# Patient Record
Sex: MALE | Race: WHITE | HISPANIC OR LATINO | URBAN - METROPOLITAN AREA
[De-identification: names, ages, dates, MRNs, and addresses within clinical notes are randomized per-mention and may not be internally consistent; named-entity substitution may affect disease eponyms.]

---

## 2018-02-04 ENCOUNTER — EMERGENCY (EMERGENCY)
Facility: HOSPITAL | Age: 23
LOS: 1 days | Discharge: HOME | End: 2018-02-04
Attending: EMERGENCY MEDICINE | Admitting: EMERGENCY MEDICINE
Payer: COMMERCIAL

## 2018-02-04 VITALS
RESPIRATION RATE: 20 BRPM | OXYGEN SATURATION: 98 % | SYSTOLIC BLOOD PRESSURE: 135 MMHG | HEART RATE: 75 BPM | TEMPERATURE: 98 F | DIASTOLIC BLOOD PRESSURE: 75 MMHG

## 2018-02-04 VITALS
DIASTOLIC BLOOD PRESSURE: 77 MMHG | HEART RATE: 80 BPM | SYSTOLIC BLOOD PRESSURE: 138 MMHG | WEIGHT: 240.08 LBS | HEIGHT: 73 IN | TEMPERATURE: 99 F | RESPIRATION RATE: 20 BRPM | OXYGEN SATURATION: 98 %

## 2018-02-04 DIAGNOSIS — R07.1 CHEST PAIN ON BREATHING: ICD-10-CM

## 2018-02-04 DIAGNOSIS — R07.89 OTHER CHEST PAIN: ICD-10-CM

## 2018-02-04 PROCEDURE — 93010 ELECTROCARDIOGRAM REPORT: CPT | Mod: NC

## 2018-02-04 PROCEDURE — 71046 X-RAY EXAM CHEST 2 VIEWS: CPT | Mod: 26

## 2018-02-04 PROCEDURE — 96372 THER/PROPH/DIAG INJ SC/IM: CPT

## 2018-02-04 PROCEDURE — 99284 EMERGENCY DEPT VISIT MOD MDM: CPT | Mod: 25

## 2018-02-04 PROCEDURE — 71046 X-RAY EXAM CHEST 2 VIEWS: CPT

## 2018-02-04 PROCEDURE — 99283 EMERGENCY DEPT VISIT LOW MDM: CPT | Mod: 25

## 2018-02-04 PROCEDURE — 93005 ELECTROCARDIOGRAM TRACING: CPT

## 2018-02-04 RX ORDER — KETOROLAC TROMETHAMINE 30 MG/ML
30 SYRINGE (ML) INJECTION ONCE
Qty: 0 | Refills: 0 | Status: DISCONTINUED | OUTPATIENT
Start: 2018-02-04 | End: 2018-02-04

## 2018-02-04 RX ADMIN — Medication 30 MILLIGRAM(S): at 15:31

## 2018-02-04 NOTE — ED ADULT NURSE NOTE - OBJECTIVE STATEMENT
patient states that he was driving his truck and felt a squeezing pain in the left side of his chest. the pain is worse with movement and palpation. also complains of back pain. 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 he states that he went out drinking last night "I had 5 cups of ines". denies drug use. lungs CTA no acute signs of distress. pending MD guzman.

## 2018-02-04 NOTE — ED PROVIDER NOTE - MUSCULOSKELETAL, MLM
Spine appears normal, range of motion is not limited, no muscle or joint tenderness, + reproducible chest pain with palpation to chest wall.

## 2018-02-04 NOTE — ED PROVIDER NOTE - OBJECTIVE STATEMENT
23 y/o m with no pmh presents to ED c/o chest pain today while driving. He state of pulling over and rushed to ED. He denies any fever, URI, cough, sob, n, v, abd pain, dysuria, No FH of heart disease , PE risk factors, drug use. Patient is noted to be on the stretcher on his phone laughing and found sleeping.

## 2018-02-04 NOTE — ED PROVIDER NOTE - MEDICAL DECISION MAKING DETAILS
Patient with atypical chest pain mostly due to costochondritis. No normal cxr and ekg show no ac changes.

## 2018-02-04 NOTE — ED PROVIDER NOTE - ATTENDING CONTRIBUTION TO CARE
pt 21 yo w no PMH, driving, felt achy feeling on L side of chest, felt momentarily light headed no cough no fever, no exertional symptoms, no smoking, had headache today , dull tight sensation at forehead, one episode of vomiting. had similar episode a few years ago although had syncope at that time and was admitted for [facility] workup at [facility] no significant findings. pt with tender chest wall, EKG wnl, cxr pending, toradol given. no FH of sudden death or young hrt dz, High suspician for musculoskeletal pain , no PE risk factors, will empirically treat, if workup reassuring will likely d/c w NSAIDs.  will advise pcp f/u

## 2018-02-04 NOTE — ED PROVIDER NOTE - CHPI ED SYMPTOMS NEG
no nausea/no diaphoresis/no syncope/no fever/no chills/no vomiting/no cough/no dizziness/no shortness of breath/no back pain

## 2023-01-12 NOTE — ED ADULT TRIAGE NOTE - ESI TRIAGE ACUITY LEVEL, MLM
3 Detail Level: Detailed Quality 130: Documentation Of Current Medications In The Medical Record: Current Medications Documented Quality 111:Pneumonia Vaccination Status For Older Adults: Pneumococcal vaccine (PPSV23) administered on or after patientâs 60th birthday and before the end of the measurement period Quality 226: Preventive Care And Screening: Tobacco Use: Screening And Cessation Intervention: Patient screened for tobacco use and is an ex/non-smoker Quality 47: Advance Care Plan: Advance Care Planning discussed and documented; advance care plan or surrogate decision maker documented in the medical record.